# Patient Record
Sex: MALE | Race: WHITE | Employment: FULL TIME | URBAN - NONMETROPOLITAN AREA
[De-identification: names, ages, dates, MRNs, and addresses within clinical notes are randomized per-mention and may not be internally consistent; named-entity substitution may affect disease eponyms.]

---

## 2017-01-09 RX ORDER — DILTIAZEM HYDROCHLORIDE 180 MG/1
CAPSULE, COATED, EXTENDED RELEASE ORAL
Qty: 90 CAPSULE | Refills: 3 | Status: SHIPPED | OUTPATIENT
Start: 2017-01-09 | End: 2017-02-06 | Stop reason: SDUPTHER

## 2017-02-06 ENCOUNTER — OFFICE VISIT (OUTPATIENT)
Dept: CARDIOLOGY CLINIC | Age: 47
End: 2017-02-06

## 2017-02-06 VITALS
RESPIRATION RATE: 16 BRPM | DIASTOLIC BLOOD PRESSURE: 80 MMHG | SYSTOLIC BLOOD PRESSURE: 134 MMHG | WEIGHT: 207 LBS | HEART RATE: 64 BPM | BODY MASS INDEX: 29.63 KG/M2 | HEIGHT: 70 IN

## 2017-02-06 DIAGNOSIS — G47.33 OSA ON CPAP: ICD-10-CM

## 2017-02-06 DIAGNOSIS — R00.1 BRADYCARDIA: ICD-10-CM

## 2017-02-06 DIAGNOSIS — R00.2 HEART PALPITATIONS: ICD-10-CM

## 2017-02-06 DIAGNOSIS — I48.0 PAF (PAROXYSMAL ATRIAL FIBRILLATION) (HCC): Primary | ICD-10-CM

## 2017-02-06 DIAGNOSIS — Z99.89 OSA ON CPAP: ICD-10-CM

## 2017-02-06 PROCEDURE — 99213 OFFICE O/P EST LOW 20 MIN: CPT | Performed by: INTERNAL MEDICINE

## 2017-02-06 PROCEDURE — 93000 ELECTROCARDIOGRAM COMPLETE: CPT | Performed by: INTERNAL MEDICINE

## 2017-02-06 RX ORDER — ATENOLOL 25 MG/1
TABLET ORAL
Qty: 90 TABLET | Refills: 3 | Status: SHIPPED | OUTPATIENT
Start: 2017-02-06 | End: 2017-02-06 | Stop reason: SDUPTHER

## 2017-02-06 RX ORDER — DILTIAZEM HYDROCHLORIDE 180 MG/1
CAPSULE, COATED, EXTENDED RELEASE ORAL
Qty: 90 CAPSULE | Refills: 3 | Status: SHIPPED | OUTPATIENT
Start: 2017-02-06 | End: 2018-02-14 | Stop reason: SDUPTHER

## 2017-02-06 RX ORDER — ATENOLOL 25 MG/1
TABLET ORAL
Qty: 90 TABLET | Refills: 3 | Status: SHIPPED | OUTPATIENT
Start: 2017-02-06 | End: 2018-05-22 | Stop reason: SDUPTHER

## 2018-02-14 RX ORDER — DILTIAZEM HYDROCHLORIDE 180 MG/1
CAPSULE, COATED, EXTENDED RELEASE ORAL
Qty: 30 CAPSULE | Refills: 1 | Status: SHIPPED | OUTPATIENT
Start: 2018-02-14 | End: 2018-04-15 | Stop reason: SDUPTHER

## 2018-03-26 RX ORDER — ATENOLOL 25 MG/1
TABLET ORAL
Qty: 30 TABLET | Refills: 0 | Status: SHIPPED | OUTPATIENT
Start: 2018-03-26 | End: 2018-04-22 | Stop reason: SDUPTHER

## 2018-04-16 RX ORDER — DILTIAZEM HYDROCHLORIDE 180 MG/1
CAPSULE, COATED, EXTENDED RELEASE ORAL
Qty: 30 CAPSULE | Refills: 1 | Status: SHIPPED | OUTPATIENT
Start: 2018-04-16 | End: 2018-05-22 | Stop reason: SDUPTHER

## 2018-04-23 RX ORDER — ATENOLOL 25 MG/1
TABLET ORAL
Qty: 30 TABLET | Refills: 0 | Status: SHIPPED | OUTPATIENT
Start: 2018-04-23 | End: 2018-05-22

## 2018-05-22 ENCOUNTER — OFFICE VISIT (OUTPATIENT)
Dept: CARDIOLOGY CLINIC | Age: 48
End: 2018-05-22

## 2018-05-22 VITALS
RESPIRATION RATE: 16 BRPM | WEIGHT: 205 LBS | BODY MASS INDEX: 29.35 KG/M2 | HEIGHT: 70 IN | HEART RATE: 53 BPM | DIASTOLIC BLOOD PRESSURE: 78 MMHG | SYSTOLIC BLOOD PRESSURE: 102 MMHG

## 2018-05-22 DIAGNOSIS — G47.33 OSA ON CPAP: ICD-10-CM

## 2018-05-22 DIAGNOSIS — I48.0 PAF (PAROXYSMAL ATRIAL FIBRILLATION) (HCC): Primary | ICD-10-CM

## 2018-05-22 DIAGNOSIS — Z99.89 OSA ON CPAP: ICD-10-CM

## 2018-05-22 DIAGNOSIS — R00.1 BRADYCARDIA: ICD-10-CM

## 2018-05-22 PROCEDURE — 93000 ELECTROCARDIOGRAM COMPLETE: CPT | Performed by: INTERNAL MEDICINE

## 2018-05-22 PROCEDURE — 99213 OFFICE O/P EST LOW 20 MIN: CPT | Performed by: INTERNAL MEDICINE

## 2018-05-22 RX ORDER — DILTIAZEM HYDROCHLORIDE 180 MG/1
CAPSULE, COATED, EXTENDED RELEASE ORAL
Qty: 90 CAPSULE | Refills: 3 | Status: SHIPPED | OUTPATIENT
Start: 2018-05-22 | End: 2019-06-10 | Stop reason: SDUPTHER

## 2018-05-22 RX ORDER — ATENOLOL 25 MG/1
TABLET ORAL
Qty: 90 TABLET | Refills: 3 | Status: SHIPPED | OUTPATIENT
Start: 2018-05-22 | End: 2019-05-30 | Stop reason: SDUPTHER

## 2018-06-13 RX ORDER — DILTIAZEM HYDROCHLORIDE 180 MG/1
CAPSULE, COATED, EXTENDED RELEASE ORAL
Qty: 30 CAPSULE | Refills: 1 | Status: SHIPPED | OUTPATIENT
Start: 2018-06-13 | End: 2019-06-10

## 2019-03-08 ENCOUNTER — INITIAL CONSULT (OUTPATIENT)
Dept: PULMONOLOGY | Age: 49
End: 2019-03-08

## 2019-03-08 VITALS
SYSTOLIC BLOOD PRESSURE: 120 MMHG | RESPIRATION RATE: 18 BRPM | DIASTOLIC BLOOD PRESSURE: 80 MMHG | HEART RATE: 63 BPM | WEIGHT: 205 LBS | BODY MASS INDEX: 29.35 KG/M2 | HEIGHT: 70 IN | OXYGEN SATURATION: 97 %

## 2019-03-08 DIAGNOSIS — Z99.89 OSA ON CPAP: Primary | ICD-10-CM

## 2019-03-08 DIAGNOSIS — G47.33 OSA ON CPAP: Primary | ICD-10-CM

## 2019-03-08 PROCEDURE — 99202 OFFICE O/P NEW SF 15 MIN: CPT | Performed by: INTERNAL MEDICINE

## 2019-05-30 RX ORDER — ATENOLOL 25 MG/1
TABLET ORAL
Qty: 90 TABLET | Refills: 3 | Status: SHIPPED | OUTPATIENT
Start: 2019-05-30 | End: 2020-04-23

## 2019-06-10 ENCOUNTER — OFFICE VISIT (OUTPATIENT)
Dept: CARDIOLOGY CLINIC | Age: 49
End: 2019-06-10
Payer: COMMERCIAL

## 2019-06-10 VITALS
HEIGHT: 71 IN | DIASTOLIC BLOOD PRESSURE: 68 MMHG | HEART RATE: 57 BPM | BODY MASS INDEX: 29.12 KG/M2 | WEIGHT: 208 LBS | SYSTOLIC BLOOD PRESSURE: 104 MMHG

## 2019-06-10 DIAGNOSIS — I48.0 PAF (PAROXYSMAL ATRIAL FIBRILLATION) (HCC): ICD-10-CM

## 2019-06-10 DIAGNOSIS — I47.1 PAROXYSMAL SUPRAVENTRICULAR TACHYCARDIA (HCC): ICD-10-CM

## 2019-06-10 DIAGNOSIS — R00.2 HEART PALPITATIONS: ICD-10-CM

## 2019-06-10 DIAGNOSIS — G47.33 OSA ON CPAP: ICD-10-CM

## 2019-06-10 DIAGNOSIS — Z99.89 OSA ON CPAP: ICD-10-CM

## 2019-06-10 DIAGNOSIS — I48.91 ATRIAL FIBRILLATION, UNSPECIFIED TYPE (HCC): Primary | ICD-10-CM

## 2019-06-10 DIAGNOSIS — R00.1 BRADYCARDIA: ICD-10-CM

## 2019-06-10 PROCEDURE — 99213 OFFICE O/P EST LOW 20 MIN: CPT | Performed by: INTERNAL MEDICINE

## 2019-06-10 PROCEDURE — 93000 ELECTROCARDIOGRAM COMPLETE: CPT | Performed by: INTERNAL MEDICINE

## 2019-06-10 RX ORDER — DILTIAZEM HYDROCHLORIDE 180 MG/1
CAPSULE, COATED, EXTENDED RELEASE ORAL
Qty: 90 CAPSULE | Refills: 3 | Status: SHIPPED | OUTPATIENT
Start: 2019-06-10 | End: 2020-06-23 | Stop reason: SDUPTHER

## 2019-06-10 NOTE — PROGRESS NOTES
Erika Flores  1970  Fouzia Nguyễn MD    Chief complaint and HPI:  Erika Flores is 42-year-old male follows up for history of paroxysmal supraventricular tachycardia and atrial fibrillation. He has obstructive sleep apnea on CPAP therapy. He has palpitations lasting for 10 to 15 seconds only occasionally. Denies any chest pain or shortness of breath. He is physically very active. He wants to run with children in a marathon monitor know if it is okay for him to do so. Chart is reviewed and he had a stress test in 2016 which was unremarkable. He had a good exercise tolerance. His medications are reviewed. He does not smoke or drink alcohol. He occasionally have a drink socially and has 1 cup of coffee a day. Rest of the Cardiovascular system review is otherwise unchanged from prior encounter. Past medical history:  has a past medical history of A-fib ([address] Chest pain, CPAP (continuous positive airway pressure) dependence, H/O 24 hour EKG monitoring, H/O cardiovascular stress test, H/O echocardiogram, H/O exercise stress test, and FAIZA on CPAP. Past surgical history:  has a past surgical history that includes cyst removal.  Social History:   Social History     Tobacco Use    Smoking status: Never Smoker    Smokeless tobacco: Never Used   Substance Use Topics    Alcohol use: No     Family history: family history includes Hypertension in his father. ALLERGIES:  Pseudoephedrine  Prior to Admission medications    Medication Sig Start Date End Date Taking? Authorizing Provider   diltiazem (CARDIZEM CD) 180 MG extended release capsule TAKE 1 CAPSULE BY MOUTH DAILY.  6/10/19 6/28/20 Yes Sandra Adams MD   atenolol (TENORMIN) 25 MG tablet TAKE 1 TABLET BY MOUTH EVERY DAY 5/30/19  Yes ERIN Bland - CNP   CPAP Machine MISC by Does not apply route   Yes Historical Provider, MD   aspirin 81 MG tablet Take 81 mg by mouth daily   Yes Historical Provider, MD     Vitals:    06/10/19 1425 BP: 104/68   Pulse: 57   Weight: 208 lb (94.3 kg)   Height: 5' 11\" (1.803 m)      Body mass index is 29.01 kg/m². Wt Readings from Last 3 Encounters:   06/10/19 208 lb (94.3 kg)   03/08/19 205 lb (93 kg)   05/22/18 205 lb (93 kg)     General appearance: Normally built and nourished male in no apparent distress    HEENT:  Pupils are equal and react to light and accomodation    Neck:  No jugular veinous distenstion or carotid bruits. Chest:  Normal in shape and excursion    Cardiac:  Normal first and second heart sounds. No murmurs or gallops noted. Respiratory:  Normal breath sounds without wheezing or crepitations. [place]:  Soft non tender, no organomegaly. Bowel sounds are normal.    Extremities:  No cyanosis, clubbing or edema. Vascular: +2 peripheral pulses in all four extremities. Neuro Exam:  Non focal for any sensory or motor deficit. Skin:  Warm and well perfused. Musculoskeletal:  WNL    EKG done today is consistent with sinus bradycardia rate of 57 bpm otherwise normal ECG. LAB REVIEW:    CBC:   Lab Results   Component Value Date    WBC 9.1 01/04/2016    HGB 14.5 01/04/2016    HCT 43.1 01/04/2016     01/04/2016     Renal:   Lab Results   Component Value Date    BUN 19 01/04/2016    CREATININE 0.9 01/04/2016     01/04/2016    K 3.8 01/04/2016     PT/INR:   Lab Results   Component Value Date    INR 0.91 01/04/2016       IMPRESSION and RECOMMENDATIONS:      Paroxysmal supraventricular tachycardia (HCC)  Well-controlled on current medications. Continue the same. PAF (paroxysmal atrial fibrillation) ([facility] [address]  Well-controlled on current medications has no significant recurrences. Continue the same. FAIZA on CPAP  Continue using CPAP regularly. Heart palpitations  Clinically stable. Minimize caffeine intake. Bradycardia  Clinically stable and asymptomatic. Well controlled on current medications, reviewed individually with patient. Continue the same. Appropriate prescriptions if needed on this visit are addressed. After visit summery is provided. Questions answered and patient verbalizes understanding. Follow up in office in 12 months with ECG, sooner if needed. Guerline Haque MD, 6/10/2019 2:59 PM     Please note this report has been partially produced using speech recognition software and may contain errors related to that system including errors in grammar, punctuation, and spelling, as well as words and phrases that may be inappropriate. If there are any questions or concerns please feel free to contact the dictating provider for clarification.

## 2019-06-10 NOTE — PATIENT INSTRUCTIONS
Well controlled on current medications, reviewed individually with patient. Continue the same. Appropriate prescriptions if needed on this visit are addressed. After visit summery is provided. Questions answered and patient verbalizes understanding. Follow up in office in 12 months with ECG, sooner if needed.

## 2020-04-23 RX ORDER — ATENOLOL 25 MG/1
TABLET ORAL
Qty: 30 TABLET | Refills: 11 | Status: SHIPPED | OUTPATIENT
Start: 2020-04-23 | End: 2020-06-23 | Stop reason: SDUPTHER

## 2020-06-23 ENCOUNTER — TELEMEDICINE (OUTPATIENT)
Dept: CARDIOLOGY CLINIC | Age: 50
End: 2020-06-23
Payer: COMMERCIAL

## 2020-06-23 PROCEDURE — 99213 OFFICE O/P EST LOW 20 MIN: CPT | Performed by: INTERNAL MEDICINE

## 2020-06-23 RX ORDER — ATENOLOL 25 MG/1
TABLET ORAL
Qty: 90 TABLET | Refills: 3 | Status: SHIPPED | OUTPATIENT
Start: 2020-06-23 | End: 2021-02-17 | Stop reason: SDUPTHER

## 2020-06-23 RX ORDER — DILTIAZEM HYDROCHLORIDE 180 MG/1
CAPSULE, COATED, EXTENDED RELEASE ORAL
Qty: 90 CAPSULE | Refills: 3 | Status: SHIPPED | OUTPATIENT
Start: 2020-06-23 | End: 2021-02-17 | Stop reason: SDUPTHER

## 2020-06-23 NOTE — PROGRESS NOTES
Normocephalic, atraumatic. [] Abnormal   [] Mouth/Throat: Mucous membranes are moist.     External Ears [x] Normal  [] Abnormal-     Pulmonary/Chest: [x] Respiratory effort normal.  [] No visualized signs of difficulty breathing or respiratory distress        [] Abnormal-      Neurological:        [x] No Facial Asymmetry (Cranial nerve 7 motor function) (limited exam to video visit)          [] No gaze palsy        [] Abnormal-         Skin:        [x] No significant exanthematous lesions or discoloration noted on facial skin         [] Abnormal-            Psychiatric:       [x] Normal Affect [] No Hallucinations        [] Abnormal-   Other pertinent observable physical exam findings-     Due to this being a TeleHealth encounter, evaluation of the following organ systems is limited: Vitals/Constitutional/EENT/Resp/CV/GI//MS/Neuro/Skin/Heme-Lymph-Imm. Lab Results   Component Value Date    WBC 9.1 01/04/2016    HGB 14.5 01/04/2016    HCT 43.1 01/04/2016     01/04/2016     Lab Results   Component Value Date    BUN 19 01/04/2016    CREATININE 0.9 01/04/2016     01/04/2016    K 3.8 01/04/2016     Lab Results   Component Value Date    INR 0.91 01/04/2016     ASSESSMENT/PLAN:    Paroxysmal supraventricular tachycardia (HCC)  Controlled on Cardizem and Tenormin. Continue the same. FAIZA on CPAP  Continue using CPAP regularly and manage weight. Continue current medications and weight management and regular exercise program.  Medications are renewed. Primary prevention by last changes and healthy lifestyles recommended. Multiple questions are answered and patient verbalized understanding. Follow up office visit in one year sooner if needed. An  electronic signature was used to authenticate this note.     --Marie Calvo MD on 6/23/2020 at 10:11 AM        Pursuant to the emergency declaration under the [facility] waiver authority and the Coronavirus Preparedness and Response Supplemental Appropriations Act, this Virtual  Visit was conducted, with patient's consent, to reduce the patient's risk of exposure to COVID-19 and provide continuity of care for an established patient. Services were provided through a video synchronous discussion virtually to substitute for in-person clinic visit.

## 2021-02-17 ENCOUNTER — OFFICE VISIT (OUTPATIENT)
Dept: CARDIOLOGY CLINIC | Age: 51
End: 2021-02-17
Payer: COMMERCIAL

## 2021-02-17 ENCOUNTER — NURSE ONLY (OUTPATIENT)
Dept: CARDIOLOGY CLINIC | Age: 51
End: 2021-02-17
Payer: COMMERCIAL

## 2021-02-17 VITALS
HEIGHT: 70 IN | SYSTOLIC BLOOD PRESSURE: 130 MMHG | BODY MASS INDEX: 30.24 KG/M2 | WEIGHT: 211.2 LBS | DIASTOLIC BLOOD PRESSURE: 88 MMHG | HEART RATE: 60 BPM

## 2021-02-17 DIAGNOSIS — R00.2 HEART PALPITATIONS: ICD-10-CM

## 2021-02-17 DIAGNOSIS — R07.89 CHEST TIGHTNESS: ICD-10-CM

## 2021-02-17 DIAGNOSIS — Z99.89 OSA ON CPAP: ICD-10-CM

## 2021-02-17 DIAGNOSIS — R00.2 PALPITATIONS: Primary | ICD-10-CM

## 2021-02-17 DIAGNOSIS — G47.33 OSA ON CPAP: ICD-10-CM

## 2021-02-17 DIAGNOSIS — I47.1 PAROXYSMAL SUPRAVENTRICULAR TACHYCARDIA (HCC): Primary | ICD-10-CM

## 2021-02-17 DIAGNOSIS — I47.1 PAROXYSMAL SUPRAVENTRICULAR TACHYCARDIA (HCC): ICD-10-CM

## 2021-02-17 PROCEDURE — 93000 ELECTROCARDIOGRAM COMPLETE: CPT | Performed by: INTERNAL MEDICINE

## 2021-02-17 PROCEDURE — 93242 EXT ECG>48HR<7D RECORDING: CPT | Performed by: INTERNAL MEDICINE

## 2021-02-17 PROCEDURE — 99214 OFFICE O/P EST MOD 30 MIN: CPT | Performed by: INTERNAL MEDICINE

## 2021-02-17 RX ORDER — ATENOLOL 25 MG/1
TABLET ORAL
Qty: 90 TABLET | Refills: 3 | Status: SHIPPED | OUTPATIENT
Start: 2021-02-17 | End: 2022-01-10 | Stop reason: SDUPTHER

## 2021-02-17 RX ORDER — DILTIAZEM HYDROCHLORIDE 180 MG/1
CAPSULE, COATED, EXTENDED RELEASE ORAL
Qty: 90 CAPSULE | Refills: 3 | Status: SHIPPED | OUTPATIENT
Start: 2021-02-17 | End: 2022-01-10 | Stop reason: SDUPTHER

## 2021-02-17 NOTE — PROGRESS NOTES
Applied @ 11:55 am, 72 hr holter w/monitor# [id number] for Dx of palpitations. Educated pt on proper holter usage; how to keep sx diary; & when to mail monitor back to [employer]. Pt voiced understanding. Holter order,including monitor & card#, & time started, to front nurse's station in 's in-box.

## 2021-02-17 NOTE — PROGRESS NOTES
[id number] Score for Atrial Fibrillation Stroke Risk   Risk   Factors  Component Value   C CHF No 0   H HTN No 0   A2 Age >= 76 No,  (53 y.o.) 0   D DM No 0   S2 Prior Stroke/TIA No 0   V Vascular Disease No 0   A Age 74-69 No,  (53 y.o.) 0   Sc Sex male 0    [id number]  Score  0   Score last updated 2/17/21 85:77 AM EST    Click here for a link to the UpToDate guideline \"Atrial Fibrillation: Anticoagulation therapy to prevent embolization    Disclaimer: Risk Score calculation is dependent on accuracy of patient problem list and past encounter diagnosis.

## 2021-02-17 NOTE — PATIENT INSTRUCTIONS
**It is YOUR responsibilty to bring medication bottles and/or updated medication list to [address] [place] [address]. This will allow us to better serve you and all your healthcare needs**      Please be informed that if you contact our office outside of normal business hours the physician on call cannot help with any scheduling or rescheduling issues, procedure instruction questions or any type of medication issue. We advise you for any urgent/emergency that you go to the nearest emergency room! PLEASE CALL OUR OFFICE DURING NORMAL BUSINESS HOURS    Monday - Friday   8 am to 5 pm    [place]: [phone]    [place]: [phone]    [place]:  [phone]  Continue current medication and minimize caffeine, chocolate  And alcohol. Stress test and 48 hour of ambulatory cardiac monitoring to evaluate symptoms further. Labs including lipids and electrolytes. Follow-up in 3-4 weeks tele health visitsooner if needed. Please hold on to these instructions the  will call you within 1-9 business days when we receive authorization from your insurance. Treadmill Stress test    WHAT TO EXPECT:     The exercise stress test is a test used to provide information about how the heart responds to exertion. It involves walking on a treadmill at increasing levels of difficulty, while electrocardiogram, heart rate, and blood pressure are monitored. ? This test will take approximately 1 hours: Please arrive at the office 5-10 min before the scheduled testing time. ? Once you are taken back to the stress lab you will be asked to read and sign a consent before proceeding with the test. At this time feel free to ask any question that you may have as the procedure is explained to you. ? You will be attached to the EKG monitoring equipment, your blood pressure will be taken, and you will begin walking on the treadmill. The treadmill starts off slowly and every 3 minutes the treadmill speeds up and the elevation increases. The average person usually walks for a period of 6-8min. PREPARATION FOR TEST:    ? Eat a light meal such as juice and toast at least 2 hours prior to the procedure. ? AVOID CAFFEINE 24 HOURS PRIOR TO THE TEST: Including coffee, Tea, [place] and other soft drinks even those labeled  caffeine free or decaffeinated. ? Please wear loose comfortable clothing and comfortable walking shoes. Please wear a short sleeved shirt. ? Please shower or bath and do not apply powder or lotion to the skin prior to testing, as the electrodes will adhere better giving us a clearer visual EKG recording.    ? DO NOT TAKE BETA-BLOCKERS 24 HOURS PRIOR TO TESTING SUCH AS:\"Tenormin (atenolol)

## 2021-02-17 NOTE — PROGRESS NOTES
Cirilo Forman (:  1970) is a 45 y.o. male,     Patient is here for further evaluation and follow up for palpitations and chest tightness. Has history of paroxysmal supraventricular tachycardia. He feels that he is having more palpitations lately at least 3 times daily and it lasts for a minute or so denies any dizziness nausea vomiting or diaphoresis with it. He also feels chest is tight enough to be noticeable last for a few minutes without any nausea vomiting or diaphoresis. It never happens when he is walking for exercise and it never wakes him up from his sleep. He is physically very active. He hasn't been traveling for his job for the last 1 year due to Covid-19 and he will be starting Soon for next month. 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 He drinks 2 cups of regular black coffee daily and alcohol once a week and he does not smoke or eat chocolate regularly. Patient had not had any blood test done for over 2 years. Chief Complaint   Patient presents with    Atrial Fibrillation     Patient here today complaining of increased episodes of a-fib. Patient  is a non-smoker that walks 2 miles, 5 days weekly. Patient denies dizziness and edema.  Chest Pain     Patient complains of chest pain, tightness that lasts less than 1 minutes for the past 2 weeks.  Palpitations     Patient complains of increased episodes of fluttering and skipping of his heart for the past 2 weeks.  Shortness of Breath     Patient complains of shortness of breath with episodes of chest pain. Allergies   Allergen Reactions    Pseudoephedrine Palpitations     Prior to Admission medications    Medication Sig Start Date End Date Taking? Authorizing Provider   dilTIAZem (CARDIZEM CD) 180 MG extended release capsule TAKE 1 CAPSULE BY MOUTH DAILY.  2/17/21 3/8/22 Yes Eleazar James MD atenolol (TENORMIN) 25 MG tablet TAKE 1 TABLET BY MOUTH EVERY DAY 2/17/21  Yes Kary Pike MD   CPAP Machine MISC by Does not apply route   Yes Historical Provider, MD   aspirin 81 MG tablet Take 81 mg by mouth daily   Yes Historical Provider, MD     Past Medical History:   Diagnosis Date    A-fib ([facility] [address]     Chest pain     CPAP (continuous positive airway pressure) dependence     H/O 24 hour EKG monitoring 3/17/03    WNL    H/O cardiovascular stress test 1/6/2016    treadmill    H/O echocardiogram 1/12/2012    WNL EF 55%    H/O exercise stress test 3/4/03    WNL    FAIZA on CPAP 1/21/2016    Dr Ritchie Deluca:    02/17/21 1119   BP: 130/88   Site: Right Upper Arm   Position: Sitting   Cuff Size: Large Adult   Pulse: 60   Weight: 211 lb 3.2 oz (95.8 kg)   Height: 5' 10\" (1.778 m)      Body mass index is 30.3 kg/m². Wt Readings from Last 3 Encounters:   02/17/21 211 lb 3.2 oz (95.8 kg)   06/10/19 208 lb (94.3 kg)   03/08/19 205 lb (93 kg)     Constitutional:  Patient is mildly overweight well-built and nourished male in no apparent distress. HEENT: He is wearing a facemask otherwise unremarkable. Cardiovascular: Auscultation: Normal S1 and S2. No murmurs or gallops noted. Carotids are negative for bruits. Abdominal aorta is nonpalpable. No epigastric bruit noted. Peripheral pulses: Pedal pulses 2+ equal in both feet. Respiratory:  Respiratory effort is normal. Breath sounds are clear to auscultation. Extremities:  No edema, clubbing,  Cyanosis, petechiae. Abdomen:  No masses or tenderness. No organomegaly noted. Neurologic:  Oriented to time, place, and person and non-anxious. No focal neurological deficit noted. Psychiatric: Normal mood and effect. EKG today is consistent with normal sinus rhythm 60 bpm essentially normal EKG.     Pertinent records reviewed and discussed with patient and results are as follow:    Lab Results   Component Value Date    WBC 9.1 01/04/2016

## 2021-02-17 NOTE — ASSESSMENT & PLAN NOTE
Not typical for angina. We will do a stress test for further evaluation. He had a stress in 2016 it was negative for any abnormalities.

## 2021-02-18 ENCOUNTER — HOSPITAL ENCOUNTER (OUTPATIENT)
Age: 51
Discharge: HOME | End: 2021-02-18
Payer: COMMERCIAL

## 2021-02-18 LAB
ALBUMIN SERPL-MCNC: 4.6 GM/DL (ref 3.4–5)
ALP BLD-CCNC: 77 IU/L (ref 40–129)
ALT SERPL-CCNC: 31 U/L (ref 10–40)
ANION GAP SERPL CALCULATED.3IONS-SCNC: 1 MMOL/L (ref 4–16)
AST SERPL-CCNC: 20 IU/L (ref 15–37)
BASOPHILS ABSOLUTE: 0.1 K/CU MM
BASOPHILS RELATIVE PERCENT: 0.9 % (ref 0–1)
BILIRUB SERPL-MCNC: 0.6 MG/DL (ref 0–1)
BUN BLDV-MCNC: 17 MG/DL (ref 6–23)
CALCIUM SERPL-MCNC: 9.6 MG/DL (ref 8.3–10.6)
CHLORIDE BLD-SCNC: 104 MMOL/L (ref 99–110)
CHOLESTEROL, FASTING: 220 MG/DL
CO2: 36 MMOL/L (ref 21–32)
CREAT SERPL-MCNC: 1 MG/DL (ref 0.9–1.3)
DIFFERENTIAL TYPE: ABNORMAL
EOSINOPHILS ABSOLUTE: 0.4 K/CU MM
EOSINOPHILS RELATIVE PERCENT: 5.5 % (ref 0–3)
GFR AFRICAN AMERICAN: >60 ML/MIN/1.73M2
GFR NON-AFRICAN AMERICAN: >60 ML/MIN/1.73M2
GLUCOSE FASTING: 97 MG/DL (ref 70–99)
HCT VFR BLD CALC: 48.7 % (ref 42–52)
HDLC SERPL-MCNC: 33 MG/DL
HEMOGLOBIN: 16 GM/DL (ref 13.5–18)
IMMATURE NEUTROPHIL %: 0.5 % (ref 0–0.43)
LDL CHOLESTEROL DIRECT: 167 MG/DL
LYMPHOCYTES ABSOLUTE: 2.4 K/CU MM
LYMPHOCYTES RELATIVE PERCENT: 38.4 % (ref 24–44)
MAGNESIUM: 1.7 MG/DL (ref 1.8–2.4)
MCH RBC QN AUTO: 30.7 PG (ref 27–31)
MCHC RBC AUTO-ENTMCNC: 32.9 % (ref 32–36)
MCV RBC AUTO: 93.3 FL (ref 78–100)
MONOCYTES ABSOLUTE: 0.5 K/CU MM
MONOCYTES RELATIVE PERCENT: 7.9 % (ref 0–4)
PDW BLD-RTO: 12.4 % (ref 11.7–14.9)
PLATELET # BLD: 286 K/CU MM (ref 140–440)
PMV BLD AUTO: 10.4 FL (ref 7.5–11.1)
POTASSIUM SERPL-SCNC: 4.3 MMOL/L (ref 3.5–5.1)
RBC # BLD: 5.22 M/CU MM (ref 4.6–6.2)
SEGMENTED NEUTROPHILS ABSOLUTE COUNT: 3 K/CU MM
SEGMENTED NEUTROPHILS RELATIVE PERCENT: 46.8 % (ref 36–66)
SODIUM BLD-SCNC: 141 MMOL/L (ref 135–145)
TOTAL IMMATURE NEUTOROPHIL: 0.03 K/CU MM
TOTAL PROTEIN: 7.3 GM/DL (ref 6.4–8.2)
TRIGLYCERIDE, FASTING: 117 MG/DL
WBC # BLD: 6.3 K/CU MM (ref 4–10.5)

## 2021-02-18 PROCEDURE — 83735 ASSAY OF MAGNESIUM: CPT

## 2021-02-18 PROCEDURE — 36415 COLL VENOUS BLD VENIPUNCTURE: CPT

## 2021-02-18 PROCEDURE — 80053 COMPREHEN METABOLIC PANEL: CPT

## 2021-02-18 PROCEDURE — 85025 COMPLETE CBC W/AUTO DIFF WBC: CPT

## 2021-02-18 PROCEDURE — 80061 LIPID PANEL: CPT

## 2021-03-02 ENCOUNTER — PROCEDURE VISIT (OUTPATIENT)
Dept: CARDIOLOGY CLINIC | Age: 51
End: 2021-03-02
Payer: COMMERCIAL

## 2021-03-02 VITALS
DIASTOLIC BLOOD PRESSURE: 86 MMHG | HEIGHT: 70 IN | SYSTOLIC BLOOD PRESSURE: 138 MMHG | WEIGHT: 211 LBS | HEART RATE: 80 BPM | TEMPERATURE: 97.4 F | BODY MASS INDEX: 30.21 KG/M2

## 2021-03-02 DIAGNOSIS — R06.02 SOB (SHORTNESS OF BREATH): ICD-10-CM

## 2021-03-02 DIAGNOSIS — I47.1 PAROXYSMAL SUPRAVENTRICULAR TACHYCARDIA (HCC): ICD-10-CM

## 2021-03-02 DIAGNOSIS — R00.2 HEART PALPITATIONS: ICD-10-CM

## 2021-03-02 DIAGNOSIS — R94.31 ABNORMAL EKG: ICD-10-CM

## 2021-03-02 DIAGNOSIS — R07.89 CHEST TIGHTNESS: Primary | ICD-10-CM

## 2021-03-02 PROCEDURE — 93015 CV STRESS TEST SUPVJ I&R: CPT | Performed by: INTERNAL MEDICINE

## 2021-03-03 ENCOUNTER — TELEPHONE (OUTPATIENT)
Dept: CARDIOLOGY CLINIC | Age: 51
End: 2021-03-03

## 2021-03-05 ENCOUNTER — TELEPHONE (OUTPATIENT)
Dept: CARDIOLOGY CLINIC | Age: 51
End: 2021-03-05

## 2021-03-05 NOTE — TELEPHONE ENCOUNTER
LVM about normal  treadmill results and pt to give us a call if he has any questions.      Summary   Overall Impression:   Normal Exercise Stress Test   Duke Score: +9 Low Risk for ischemia   Appropriate BP response to exercise   No Limiting angina or dyspnea   Achieved over 10 METS - Good Functional capacity

## 2021-03-22 ENCOUNTER — OFFICE VISIT (OUTPATIENT)
Dept: CARDIOLOGY CLINIC | Age: 51
End: 2021-03-22
Payer: COMMERCIAL

## 2021-03-22 VITALS
BODY MASS INDEX: 29.12 KG/M2 | TEMPERATURE: 97.2 F | HEIGHT: 71 IN | DIASTOLIC BLOOD PRESSURE: 72 MMHG | WEIGHT: 208 LBS | HEART RATE: 50 BPM | SYSTOLIC BLOOD PRESSURE: 120 MMHG

## 2021-03-22 DIAGNOSIS — R00.2 HEART PALPITATIONS: ICD-10-CM

## 2021-03-22 DIAGNOSIS — R06.02 SOB (SHORTNESS OF BREATH): ICD-10-CM

## 2021-03-22 DIAGNOSIS — I47.1 PAROXYSMAL SUPRAVENTRICULAR TACHYCARDIA (HCC): ICD-10-CM

## 2021-03-22 DIAGNOSIS — R07.89 CHEST TIGHTNESS: Primary | ICD-10-CM

## 2021-03-22 PROCEDURE — 99213 OFFICE O/P EST LOW 20 MIN: CPT | Performed by: INTERNAL MEDICINE

## 2021-03-22 NOTE — PROGRESS NOTES
Richard Barrera (:  1970) is a 45 y.o. male,     Patient is here for follow-up for palpitations and history of cardiac arrhythmias and shortness of breath and chest tightness. He reports his symptoms have resolved completely since last visit last month and is mainly here to follow-up on the results of blood test and Holter monitor and a stress test he had. He denies any ongoing chest tightness or palpitations or dizziness. He continues to use CPAP for obstructive sleep apnea and feels that he sleeps well and he walks for exercise. Occasions are reviewed and he has been compliant to medications. He does not smoke. Allergies   Allergen Reactions    Pseudoephedrine Palpitations     Prior to Admission medications    Medication Sig Start Date End Date Taking? Authorizing Provider   dilTIAZem (CARDIZEM CD) 180 MG extended release capsule TAKE 1 CAPSULE BY MOUTH DAILY. 2/17/21 3/8/22 Yes Megan Gordon MD   atenolol (TENORMIN) 25 MG tablet TAKE 1 TABLET BY MOUTH EVERY DAY 21  Yes Megan Gordon MD   CPAP Machine MISC by Does not apply route   Yes Historical Provider, MD   aspirin 81 MG tablet Take 81 mg by mouth daily   Yes Historical Provider, MD     Past Medical History:   Diagnosis Date    [facility]     Abnormal EKG     Chest pain     H/O cardiovascular stress test 2016    treadmill    H/O cardiovascular stress test 2021    Normal Exercise stress test.    H/O echocardiogram 2012    WNL EF 55%    FAIZA on CPAP 2016    Dr Marc Marley     SOB (shortness of breath)       Vitals:    21 0836   BP: 120/72   Pulse: 50   Temp: 97.2 °F (36.2 °C)   Weight: 208 lb (94.3 kg)   Height: 5' 11\" (1.803 m)      Body mass index is 29.01 kg/m².   Wt Readings from Last 3 Encounters:   21 208 lb (94.3 kg)   21 211 lb (95.7 kg)   21 211 lb 3.2 oz (95.8 kg)     Pertinent records reviewed and discussed with patient and results are as follow:  48 hours of cardiac monitoring of technically good quality consistent with normal sinus rhythm at average rate of 61 bpm.  Minimum rate of 34 bpm occurred at 12:35 AM and the maximum rate of 120 bpm occurred at 2:22 PM.  Rare isolated PACs and infrequent isolated PVCs noted. Patient reported chest discomfort on February 17 at 5:39 PM while patient was sitting during normal sinus rhythm and baseline artifact and possibly isolated PVC. [place] symptoms are reported during normal rate and rhythm with sinus arrhythmia on February 18 at 7:52 PM.  No symptoms reported during minimum heart rate during hours of sleep. Rhythm analysis suggest possible junctional rhythm. Conclusion: Abnormal Holter findings suggesting marked bradycardia during hours of sleep. Symptoms are reported during normal rate and rhythm and during isolated ectopy     Normal Exercise Stress Test   Duke Score: +9 Low Risk for ischemia   Appropriate BP response to exercise   No Limiting angina or dyspnea   Achieved over 10 METS - Good Functional capacity      Functional Capacity: Excellent Exercise Tolerance. No chest pain noted   during testing.     The 10-year ASCVD risk score (Adina Pearson., et al., 2013) is: 5.9%    Values used to calculate the score:      Age: 45 years      Sex: Male      Is Non- : No      Diabetic: No      Tobacco smoker: No      Systolic Blood Pressure: 868 mmHg      Is BP treated: No      HDL Cholesterol: 33 MG/DL      Total Cholesterol: 220 MG/DL    Lab Results   Component Value Date    WBC 6.3 02/18/2021    HGB 16.0 02/18/2021    HCT 48.7 02/18/2021     02/18/2021     Lab Results   Component Value Date    CHOLFAST 220 (H) 02/18/2021    TRIGLYCFAST 117 02/18/2021    HDL 33 (L) 02/18/2021    LDLDIRECT 167 (H) 02/18/2021     Lab Results   Component Value Date    BUN 17 02/18/2021    CREATININE 1.0 02/18/2021     02/18/2021    K 4.3 02/18/2021     Lab Results   Component Value Date    INR 0.91 01/04/2016 ASSESSMENT/PLAN:    1. Chest tightness  2. Heart palpitations  3. Paroxysmal supraventricular tachycardia ([address]  4. SOB (shortness of breath)    Symptoms of chest tightness and palpitations have resolved and had not had any cardiac arrhythmias of clinical significance on recent Holter monitor. He denies any dyspnea on exertion today. We discussed the hyperlipidemia results in detail and guideline directed therapy with diet and exercise and weight management for the next 6 months is counseled and multiple questions are answered. LDL goal is below 130. His ASCVD risk is low. Primary prevention is the goal by aggressive risk modification, healthy and therapeutic life style changes for cardiovascular risk reduction. Various goals are discussed and questions answered. Follow-up in 6 months with repeat lipids ( done at work), sooner if needed. On this date 3/22/2021 I have spent 15 minutes reviewing previous notes, test results and face to face with the patient discussing the diagnosis and importance of compliance with the treatment plan as well as documenting on the day of the visit. An electronic signature was used to authenticate this note.     --Oscar Pinto MD

## 2022-01-10 ENCOUNTER — OFFICE VISIT (OUTPATIENT)
Dept: CARDIOLOGY CLINIC | Age: 52
End: 2022-01-10
Payer: COMMERCIAL

## 2022-01-10 VITALS
SYSTOLIC BLOOD PRESSURE: 116 MMHG | HEIGHT: 70 IN | DIASTOLIC BLOOD PRESSURE: 82 MMHG | HEART RATE: 56 BPM | BODY MASS INDEX: 29.46 KG/M2 | WEIGHT: 205.8 LBS

## 2022-01-10 DIAGNOSIS — R00.1 BRADYCARDIA: ICD-10-CM

## 2022-01-10 DIAGNOSIS — G47.33 OSA ON CPAP: ICD-10-CM

## 2022-01-10 DIAGNOSIS — R00.2 HEART PALPITATIONS: ICD-10-CM

## 2022-01-10 DIAGNOSIS — Z99.89 OSA ON CPAP: ICD-10-CM

## 2022-01-10 DIAGNOSIS — I47.1 PAROXYSMAL SUPRAVENTRICULAR TACHYCARDIA (HCC): Primary | ICD-10-CM

## 2022-01-10 PROCEDURE — 99214 OFFICE O/P EST MOD 30 MIN: CPT | Performed by: INTERNAL MEDICINE

## 2022-01-10 RX ORDER — ATENOLOL 25 MG/1
TABLET ORAL
Qty: 90 TABLET | Refills: 3 | Status: SHIPPED | OUTPATIENT
Start: 2022-01-10

## 2022-01-10 RX ORDER — DILTIAZEM HYDROCHLORIDE 180 MG/1
CAPSULE, COATED, EXTENDED RELEASE ORAL
Qty: 90 CAPSULE | Refills: 3 | Status: SHIPPED | OUTPATIENT
Start: 2022-01-10 | End: 2023-01-29

## 2022-01-10 NOTE — PROGRESS NOTES
Williemae Alpers (:  1970) is a 45 y.o. male,     Chief Complaint   Patient presents with    6 Month Follow-Up     Pt denies any cardiac symptoms. No swelling and No surgeries or procedures scheduled. Pt does drink alcohol socially, Pt does not smoke, and pt does drink 3 cups of coffee a day. Pt does exericse. Patient is here for follow up for known atrial fibrillation and has obstructive sleep apnea history of palpitations sinus bradycardia. Has occasional palpitations. He had cholecystectomy since last office visit had no problems. Did not have any perioperative arrhythmias. He exercises mainly in the form of brisk walking regularly. Denies any chest pains or unusual shortness of breath or syncope or near syncope or dizzy spells. Is compliant with his medication. He does not smoke. He travels for the job quite a bit. He is fully vaccinated for COVID-19 including the booster. Allergies   Allergen Reactions    Pseudoephedrine Palpitations     Prior to Admission medications    Medication Sig Start Date End Date Taking? Authorizing Provider   dilTIAZem (CARDIZEM CD) 180 MG extended release capsule TAKE 1 CAPSULE BY MOUTH DAILY.  1/10/22 1/29/23 Yes Kassandra Cordero MD   atenolol (TENORMIN) 25 MG tablet TAKE 1 TABLET BY MOUTH EVERY DAY 1/10/22  Yes Kassandra Cordero MD   CPAP Machine MISC by Does not apply route   Yes Historical Provider, MD   aspirin 81 MG tablet Take 81 mg by mouth daily   Yes Historical Provider, MD   HYDROcodone-acetaminophen [facility]) 5-325 MG per tablet  6/15/21   Historical Provider, MD     Past Medical History:   Diagnosis Date    A-fib ([facility] [address]     Abnormal EKG     Chest pain     H/O cardiovascular stress test 2016    treadmill    H/O cardiovascular stress test 2021    Normal Exercise stress test.    H/O echocardiogram 2012    WNL EF 55%    FAIZA on CPAP 2016    Dr Catrachita Mendoza     SOB (shortness of breath)       Vitals:    01/10/22 0905   BP: 116/82 Pulse: 56   Weight: 205 lb 12.8 oz (93.4 kg)   Height: 5' 10\" (1.778 m)      Body mass index is 29.53 kg/m². Wt Readings from Last 3 Encounters:   01/10/22 205 lb 12.8 oz (93.4 kg)   06/23/21 205 lb (93 kg)   05/24/21 205 lb (93 kg)     Constitutional:  Patient is mildly overweight tall pleasant male in no apparent distress. HEENT: Is wearing facemask otherwise unremarkable. Cardiovascular: Auscultation: Normal S1 and S2. No murmurs or gallops noted. Carotids are negative for bruits. Abdominal aorta is nonpalpable. No epigastric bruit noted. Peripheral pulses: Pedal pulses are 1-2+ equal in both feet. Respiratory:  Respiratory effort is normal. Breath sounds are clear to auscultation. Extremities:  No edema, clubbing,  Cyanosis, petechiae. Abdomen:  No masses or tenderness. No organomegaly noted. Neurologic:  Oriented to time, place, and person and non-anxious. No focal neurological deficit noted. Psychiatric: Normal mood and effect. Pertinent records reviewed and discussed with patient and results are as follow:    Lab Results   Component Value Date    WBC 6.3 02/18/2021    HGB 16.0 02/18/2021    HCT 48.7 02/18/2021     02/18/2021     Lab Results   Component Value Date    CHOLFAST 220 (H) 02/18/2021    TRIGLYCFAST 117 02/18/2021    HDL 33 (L) 02/18/2021    LDLDIRECT 167 (H) 02/18/2021     Lab Results   Component Value Date    BUN 17 02/18/2021    CREATININE 1.0 02/18/2021     02/18/2021    K 4.3 02/18/2021     Lab Results   Component Value Date    INR 0.91 01/04/2016     ASSESSMENT/PLAN:    1. Paroxysmal supraventricular tachycardia (HCC)  Assessment & Plan:  Well-controlled on current combination of medications. Continue both Cardizem and atenolol and low-dose aspirin. 2. FAIZA on CPAP  Assessment & Plan:  Continue compliance to CPAP therapy and follows up with pulmonary. 3. Bradycardia  Assessment & Plan:  Asymptomatic. Continue current medications.    4. Heart palpitations  Assessment & Plan:  Controlled on atenolol and Cardizem. Continue current cardiovascular medications which have been reviewed and discussed individually with you. Continue to exercise regularly. Follow-up in 12 months with EKG, sooner if needed. An electronic signature was used to authenticate this note.     --Jcarlos Davison MD

## 2022-01-10 NOTE — ASSESSMENT & PLAN NOTE
Well-controlled on current combination of medications. Continue both Cardizem and atenolol and low-dose aspirin.

## 2022-01-10 NOTE — LETTER
Robert Farley  1970  [id number]    Have you had any Chest Pain that is not new? - No   DO EKG IF: Patient has a Heart Rate above 100 or below 40     CAD (Coronary Artery Disease) patient should have one on file every 6 months      Have you had any Shortness of Breath - No    Have you had any dizziness - No    Have you had any palpitations that are not new? - No    Do you have any edema - swelling in No      When did you have your last labs drawn 06/15/2021  Where did you have them done   What doctor ordered Edie/Farheen    If we do not have these labs you are retrieve these labs for these providers!     Do you have a surgery or procedure scheduled in the near future - No     Ask patient if they want to sign up for [employer] if they are not already signed up     Check to see if we have an E-MAIL on file for the patient     Check medication list thoroughly!!! AND RECONCILE OUTSIDE MEDICATIONS  If dose has changed change the entire order not just the MG  BE SURE TO ASK PATIENT IF THEY NEED MEDICATION REFILLS     At check out add to every patient's \"wrap up\" the following dot phrase AFTERHOURSEDUCATION and ensure we explain this to our patients

## 2022-01-10 NOTE — PATIENT INSTRUCTIONS
**It is YOUR responsibilty to bring medication bottles and/or updated medication list to [address] [place] [address]. This will allow us to better serve you and all your healthcare needs**   Please be informed that if you contact our office outside of normal business hours the physician on call cannot help with any scheduling or rescheduling issues, procedure instruction questions or any type of medication issue. We advise you for any urgent/emergency that you go to the nearest emergency room! PLEASE CALL OUR OFFICE DURING NORMAL BUSINESS HOURS    Monday - Friday   8 am to 5 pm    [place]: [phone]    [place]:  [phone]  Continue current cardiovascular medications which have been reviewed and discussed individually with you. Continue to exercise regularly. Follow-up in 12 months with EKG, sooner if needed.

## 2023-02-02 RX ORDER — DILTIAZEM HYDROCHLORIDE 180 MG/1
CAPSULE, COATED, EXTENDED RELEASE ORAL
Qty: 30 CAPSULE | Refills: 0 | Status: SHIPPED | OUTPATIENT
Start: 2023-02-02

## 2023-03-06 RX ORDER — DILTIAZEM HYDROCHLORIDE 180 MG/1
CAPSULE, COATED, EXTENDED RELEASE ORAL
Qty: 90 CAPSULE | Refills: 3 | Status: SHIPPED | OUTPATIENT
Start: 2023-03-06

## 2023-03-08 RX ORDER — ATENOLOL 25 MG/1
TABLET ORAL
Qty: 90 TABLET | Refills: 3 | Status: SHIPPED | OUTPATIENT
Start: 2023-03-08

## 2023-07-03 ENCOUNTER — OFFICE VISIT (OUTPATIENT)
Dept: CARDIOLOGY CLINIC | Age: 53
End: 2023-07-03
Payer: COMMERCIAL

## 2023-07-03 VITALS
RESPIRATION RATE: 16 BRPM | HEIGHT: 71 IN | HEART RATE: 76 BPM | BODY MASS INDEX: 29.26 KG/M2 | DIASTOLIC BLOOD PRESSURE: 80 MMHG | SYSTOLIC BLOOD PRESSURE: 130 MMHG | OXYGEN SATURATION: 97 % | WEIGHT: 209 LBS

## 2023-07-03 DIAGNOSIS — G47.33 OSA ON CPAP: ICD-10-CM

## 2023-07-03 DIAGNOSIS — R00.2 HEART PALPITATIONS: Primary | ICD-10-CM

## 2023-07-03 DIAGNOSIS — Z99.89 OSA ON CPAP: ICD-10-CM

## 2023-07-03 DIAGNOSIS — I47.1 PAROXYSMAL SUPRAVENTRICULAR TACHYCARDIA (HCC): ICD-10-CM

## 2023-07-03 PROCEDURE — 93000 ELECTROCARDIOGRAM COMPLETE: CPT | Performed by: INTERNAL MEDICINE

## 2023-07-03 PROCEDURE — 99214 OFFICE O/P EST MOD 30 MIN: CPT | Performed by: INTERNAL MEDICINE

## 2023-07-03 RX ORDER — DILTIAZEM HYDROCHLORIDE 180 MG/1
180 CAPSULE, COATED, EXTENDED RELEASE ORAL DAILY
Qty: 90 CAPSULE | Refills: 3 | Status: SHIPPED | OUTPATIENT
Start: 2023-07-03

## 2023-07-03 RX ORDER — ATENOLOL 25 MG/1
25 TABLET ORAL DAILY
Qty: 90 TABLET | Refills: 3 | Status: SHIPPED | OUTPATIENT
Start: 2023-07-03

## 2023-07-03 RX ORDER — ASCORBIC ACID 250 MG
TABLET,CHEWABLE ORAL
COMMUNITY

## 2023-07-03 NOTE — PROGRESS NOTES
palpitations  Assessment & Plan:  Have completely resolved on current medications continue the same. Orders:  -     EKG 12 lead  2. FAIZA on CPAP  Assessment & Plan:  He reports excessive fatigue and daytime sleepiness on certain days. Patient is counseled to follow-up with Dr. Bairon Russell or someone who can check his CPAP to see if it is optimally functioning. 3. Paroxysmal supraventricular tachycardia ([address])  Assessment & Plan:  Controlled on current medications continue the same. Continue current cardiovascular medications which have been reviewed and discussed individually with you. Appropriate prescriptions if needed on this visit are addressed. After visit summery is provided. Questions answered and patient verbalizes understanding. Follow up in 12 months with ECG,  sooner if needed. Please bring all medication bottles and most recent labs to each office visit      Sharmin Reese MD, 7/3/2023 4:39 PM     Please note this report has been partially produced using speech recognition software and may contain errors related to that system including errors in grammar, punctuation, and spelling, as well as words and phrases that may be inappropriate. If there are any questions or concerns please feel free to contact the dictating provider for clarification.

## 2023-07-03 NOTE — PATIENT INSTRUCTIONS
Continue current cardiovascular medications which have been reviewed and discussed individually with you. Appropriate prescriptions if needed on this visit are addressed. After visit summery is provided. Questions answered and patient verbalizes understanding. Follow up in 12 months with ECG,  sooner if needed.  Please bring all medication bottles and most recent labs to each office visit

## 2023-07-03 NOTE — ASSESSMENT & PLAN NOTE
He reports excessive fatigue and daytime sleepiness on certain days. Patient is counseled to follow-up with Dr. Shabbir Schmitz or someone who can check his CPAP to see if it is optimally functioning.

## 2023-08-17 ENCOUNTER — HOSPITAL ENCOUNTER (EMERGENCY)
Age: 53
Discharge: HOME | End: 2023-08-17
Attending: EMERGENCY MEDICINE
Payer: COMMERCIAL

## 2023-08-17 VITALS
HEIGHT: 71 IN | HEART RATE: 79 BPM | DIASTOLIC BLOOD PRESSURE: 93 MMHG | BODY MASS INDEX: 28 KG/M2 | SYSTOLIC BLOOD PRESSURE: 139 MMHG | OXYGEN SATURATION: 97 % | TEMPERATURE: 98.6 F | RESPIRATION RATE: 18 BRPM | WEIGHT: 200 LBS

## 2023-08-17 DIAGNOSIS — J06.9 ACUTE UPPER RESPIRATORY INFECTION: Primary | ICD-10-CM

## 2023-08-17 DIAGNOSIS — A49.9 BACTERIAL INFECTION: ICD-10-CM

## 2023-08-17 PROCEDURE — 6370000000 HC RX 637 (ALT 250 FOR IP): Performed by: EMERGENCY MEDICINE

## 2023-08-17 PROCEDURE — 99283 EMERGENCY DEPT VISIT LOW MDM: CPT

## 2023-08-17 RX ORDER — DEXTROMETHORPHAN HYDROBROMIDE AND PROMETHAZINE HYDROCHLORIDE 15; 6.25 MG/5ML; MG/5ML
5 SYRUP ORAL 4 TIMES DAILY PRN
Qty: 118 ML | Refills: 0 | Status: SHIPPED | OUTPATIENT
Start: 2023-08-17 | End: 2023-08-24

## 2023-08-17 RX ORDER — CETIRIZINE HYDROCHLORIDE 10 MG/1
10 TABLET ORAL DAILY
Qty: 20 TABLET | Refills: 0 | Status: SHIPPED | OUTPATIENT
Start: 2023-08-17

## 2023-08-17 RX ORDER — PROMETHAZINE HYDROCHLORIDE AND CODEINE PHOSPHATE 6.25; 1 MG/5ML; MG/5ML
5 SOLUTION ORAL ONCE
Status: COMPLETED | OUTPATIENT
Start: 2023-08-17 | End: 2023-08-17

## 2023-08-17 RX ORDER — AZITHROMYCIN 250 MG/1
TABLET, FILM COATED ORAL
Qty: 1 PACKET | Refills: 0 | Status: SHIPPED | OUTPATIENT
Start: 2023-08-17 | End: 2023-08-27

## 2023-08-17 RX ORDER — AZITHROMYCIN 250 MG/1
500 TABLET, FILM COATED ORAL ONCE
Status: COMPLETED | OUTPATIENT
Start: 2023-08-17 | End: 2023-08-17

## 2023-08-17 RX ORDER — DIPHENHYDRAMINE HCL 25 MG
50 CAPSULE ORAL ONCE
Status: COMPLETED | OUTPATIENT
Start: 2023-08-17 | End: 2023-08-17

## 2023-08-17 RX ADMIN — Medication 5 ML: at 20:14

## 2023-08-17 RX ADMIN — DIPHENHYDRAMINE HYDROCHLORIDE 50 MG: 25 CAPSULE ORAL at 20:14

## 2023-08-17 RX ADMIN — AZITHROMYCIN DIHYDRATE 500 MG: 250 TABLET ORAL at 20:13

## 2023-08-17 ASSESSMENT — ENCOUNTER SYMPTOMS
EYES NEGATIVE: 1
RHINORRHEA: 1
COUGH: 1
SWOLLEN GLANDS: 0
WHEEZING: 0
GASTROINTESTINAL NEGATIVE: 1
SINUS PAIN: 1
SORE THROAT: 1

## 2023-08-17 NOTE — ED PROVIDER NOTES
URI  Presenting symptoms: congestion, cough, fever, rhinorrhea and sore throat    Cough:     Cough characteristics:  Productive    Sputum characteristics:  Yellow    Severity:  Severe    Onset quality:  Gradual    Timing:  Constant    Progression:  Worsening    Chronicity:  New  Severity:  Moderate  Timing:  Constant  Progression:  Worsening  Chronicity:  New  Relieved by:  Nothing  Worsened by:  Nothing  Ineffective treatments:  None tried  Associated symptoms: arthralgias, headaches and sinus pain    Associated symptoms: no myalgias, no neck pain, no sneezing, no swollen glands and no wheezing      Review of Systems   Constitutional:  Positive for fever. HENT:  Positive for congestion, rhinorrhea, sinus pain and sore throat. Negative for sneezing. Eyes: Negative. Respiratory:  Positive for cough. Negative for wheezing. Cardiovascular: Negative. Gastrointestinal: Negative. Genitourinary: Negative. Musculoskeletal:  Positive for arthralgias. Negative for myalgias and neck pain. Skin: Negative. Neurological:  Positive for headaches. All other systems reviewed and are negative.     Family History   Problem Relation Age of Onset    Hypertension Father      Social History     Socioeconomic History    Marital status:      Spouse name: Not on file    Number of children: Not on file    Years of education: Not on file    Highest education level: Not on file   Occupational History    Not on file   Tobacco Use    Smoking status: Never    Smokeless tobacco: Never   Substance and Sexual Activity    Alcohol use: No    Drug use: No    Sexual activity: Yes     Partners: Female     Comment:    Other Topics Concern    Not on file   Social History Narrative    Not on file     Social Determinants of Health     Financial Resource Strain: Not on file   Food Insecurity: Not on file   Transportation Needs: Not on file   Physical Activity: Not on file   Stress: Not on file   Social Connections: Not

## 2023-08-18 NOTE — ED NOTES
Discharged with instructions and rx x 3. Pt acknowledges understanding. Ambulatory at discharge.        Pacheco Taylor RN  08/17/23 2026

## 2024-01-29 ENCOUNTER — OFFICE VISIT (OUTPATIENT)
Dept: CARDIOLOGY CLINIC | Age: 54
End: 2024-01-29
Payer: COMMERCIAL

## 2024-01-29 VITALS
HEIGHT: 71 IN | BODY MASS INDEX: 28.98 KG/M2 | SYSTOLIC BLOOD PRESSURE: 134 MMHG | WEIGHT: 207 LBS | DIASTOLIC BLOOD PRESSURE: 80 MMHG | HEART RATE: 53 BPM

## 2024-01-29 DIAGNOSIS — G47.33 OSA ON CPAP: ICD-10-CM

## 2024-01-29 DIAGNOSIS — I47.10 PAROXYSMAL SUPRAVENTRICULAR TACHYCARDIA: ICD-10-CM

## 2024-01-29 DIAGNOSIS — Z02.89 ENCOUNTER FOR EXAMINATION REQUIRED BY DEPARTMENT OF TRANSPORTATION (DOT): Primary | ICD-10-CM

## 2024-01-29 PROCEDURE — 93000 ELECTROCARDIOGRAM COMPLETE: CPT | Performed by: INTERNAL MEDICINE

## 2024-01-29 PROCEDURE — 99214 OFFICE O/P EST MOD 30 MIN: CPT | Performed by: INTERNAL MEDICINE

## 2024-01-29 RX ORDER — DILTIAZEM HYDROCHLORIDE 180 MG/1
180 CAPSULE, COATED, EXTENDED RELEASE ORAL DAILY
Qty: 90 CAPSULE | Refills: 3 | Status: SHIPPED | OUTPATIENT
Start: 2024-01-29

## 2024-01-29 RX ORDER — ATENOLOL 25 MG/1
25 TABLET ORAL DAILY
Qty: 90 TABLET | Refills: 3 | Status: SHIPPED | OUTPATIENT
Start: 2024-01-29

## 2024-01-29 NOTE — PATIENT INSTRUCTIONS
Cleared for DOT. No need for further testings. Continue current cardiovascular medications which have been reviewed and discussed individually with you.  Continue compliance to CPAP.  Appropriate prescriptions if needed on this visit are addressed. After visit summery is provided.   Questions answered and patient verbalizes understanding. Follow up in 12 months with ECG,  sooner if needed.

## 2024-01-29 NOTE — ASSESSMENT & PLAN NOTE
Patient is functional class I and has no significant cardiac issues to warrant further investigation.  His echocardiogram was within normal limits in 2012 and a stress test in 2021 was negative for any abnormalities.  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  He is compliant to CPAP therapy and had not had much issues with daytime drowsiness or sleepiness.

## 2024-01-29 NOTE — PROGRESS NOTES
Roger Duvall  1970  Michelle Dugan APRN - [place]      Chief Complaint   Patient presents with    Other    Follow-up     No chest pain, SOB dizziness, swelling or palpitations  Pt here for DOT clearance     Chief complaint and HPI:  Roger Duvall  is a 53 y.o. male following up for DOT clearance.  He is says he has never been asked to have the clearance done before but now they are asking and he denies any palpitations or any cardiac symptoms.  He denies any unusual dyspnea on exertion.  He is compliant to CPAP therapy has been using it for 7 years and feels well rested and denies any drowsiness at work or loss of concentration etc.  He exercises regularly at least 5 days a week at least 2 miles sometimes 3 miles on the weekends when he is walking.  He walks outdoors when weather permitting otherwise he has a treadmill.    Rest of the Cardiovascular system review is otherwise unchanged from prior encounter.  Past medical history:  has a past medical history of A-fib (HCC), Abnormal EKG, Chest pain, H/O cardiovascular stress test, H/O cardiovascular stress test, H/O echocardiogram, FAIZA on CPAP, and SOB (shortness of breath).  Past surgical history:  has a past surgical history that includes cyst removal and Cholecystectomy (06/15/2021).  Social History:   Social History     Tobacco Use    Smoking status: Never    Smokeless tobacco: Never   Substance Use Topics    Alcohol use: No     Family history: family history includes Hypertension in his father.  ALLERGIES:  Pseudoephedrine  Prior to Admission medications    Medication Sig Start Date End Date Taking? Authorizing Provider   dilTIAZem (CARDIZEM CD) 180 MG extended release capsule Take 1 capsule by mouth daily 1/29/24  Yes Michelle Elam MD   atenolol (TENORMIN) 25 MG tablet Take 1 tablet by mouth daily 1/29/24  Yes Michelle Elam MD   Ascorbic Acid (VITAMIN C) 250 MG CHEW Take by mouth   Yes ProviderShagufta MD   CALCIUM-MAGNESIUM-ZINC PO Take by

## 2024-01-29 NOTE — ASSESSMENT & PLAN NOTE
Patient has been in normal sinus rhythm and had not had much palpitations.  Continue current medications.

## 2025-02-03 ENCOUNTER — TELEPHONE (OUTPATIENT)
Dept: CARDIOLOGY CLINIC | Age: 55
End: 2025-02-03

## 2025-02-03 ENCOUNTER — OFFICE VISIT (OUTPATIENT)
Dept: CARDIOLOGY CLINIC | Age: 55
End: 2025-02-03
Payer: COMMERCIAL

## 2025-02-03 VITALS
WEIGHT: 215.2 LBS | HEART RATE: 58 BPM | BODY MASS INDEX: 30.13 KG/M2 | HEIGHT: 71 IN | DIASTOLIC BLOOD PRESSURE: 80 MMHG | SYSTOLIC BLOOD PRESSURE: 138 MMHG

## 2025-02-03 DIAGNOSIS — R00.2 HEART PALPITATIONS: Primary | ICD-10-CM

## 2025-02-03 DIAGNOSIS — I47.10 PAROXYSMAL SUPRAVENTRICULAR TACHYCARDIA (HCC): ICD-10-CM

## 2025-02-03 DIAGNOSIS — G47.33 OSA ON CPAP: ICD-10-CM

## 2025-02-03 DIAGNOSIS — Z02.89 ENCOUNTER FOR EXAMINATION REQUIRED BY DEPARTMENT OF TRANSPORTATION (DOT): ICD-10-CM

## 2025-02-03 PROBLEM — R07.89 CHEST TIGHTNESS: Status: RESOLVED | Noted: 2021-02-17 | Resolved: 2025-02-03

## 2025-02-03 PROCEDURE — 99214 OFFICE O/P EST MOD 30 MIN: CPT | Performed by: INTERNAL MEDICINE

## 2025-02-03 PROCEDURE — 93000 ELECTROCARDIOGRAM COMPLETE: CPT | Performed by: INTERNAL MEDICINE

## 2025-02-03 NOTE — PROGRESS NOTES
Roger Duvall  1970  Samia Pizarro MD      Chief Complaint   Patient presents with    1 Year Follow Up     Pt denies chest pain, dizziness, edema, SOB. Pt has some palpitations at times.     Palpitations     Chief complaint and HPI:  Roger Duvall  is a 54 y.o. male following up for history of PSVT and has obstructive sleep apnea.  Denies any palpitations or new cardiac symptoms.  Physically not very active because he had back injury at work and was put on light duty.  He has been compliant to medications has been reviewed and renewed by PCP last November and he had blood test done also reportedly they were good we do not have access to those records at the time of this note.  He has been compliant to CPAP therapy for obstructive sleep apnea.    Rest of the Cardiovascular system review is otherwise unchanged from prior encounter.  Past medical history:  has a past medical history of A-fib (HCC), Abnormal EKG, Chest pain, H/O cardiovascular stress test, H/O cardiovascular stress test, H/O echocardiogram, FAIZA on CPAP, and SOB (shortness of breath).  Past surgical history:  has a past surgical history that includes cyst removal and Cholecystectomy (06/15/2021).  Social History:   Social History     Tobacco Use    Smoking status: Never    Smokeless tobacco: Never   Substance Use Topics    Alcohol use: No     Family history: family history includes Hypertension in his father.  ALLERGIES:  Pseudoephedrine    Prior to Admission medications    Medication Sig Start Date End Date Taking? Authorizing Provider   dilTIAZem (CARDIZEM CD) 180 MG extended release capsule Take 1 capsule by mouth daily 1/29/24  Yes Michelle Elam MD   atenolol (TENORMIN) 25 MG tablet Take 1 tablet by mouth daily 1/29/24  Yes Michelle Elam MD   Ascorbic Acid (VITAMIN C) 250 MG CHEW Take by mouth   Yes Shagufta Jett MD   CALCIUM-MAGNESIUM-ZINC PO Take by mouth   Yes Shagufta Jett MD   CPAP Machine MISC by Does not apply

## 2025-02-03 NOTE — PATIENT INSTRUCTIONS
Continue current cardiovascular medications which have been reviewed and discussed individually with you.  Primary prevention is the goal by aggressive risk modification, healthy and therapeutic life style changes for cardiovascular risk reduction. Various goals are discussed and questions answered.  Appropriate prescriptions if needed on this visit are addressed. After visit summery is provided.   Questions answered and patient verbalizes understanding. Follow up in 12 months with ECG,  sooner if needed.  Please bring all medication bottles and most recent labs to each office visit

## 2025-02-03 NOTE — ASSESSMENT & PLAN NOTE
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